# Patient Record
Sex: FEMALE
[De-identification: names, ages, dates, MRNs, and addresses within clinical notes are randomized per-mention and may not be internally consistent; named-entity substitution may affect disease eponyms.]

---

## 2020-06-12 ENCOUNTER — DASHBOARD ENCOUNTERS (OUTPATIENT)
Age: 25
End: 2020-06-12

## 2020-06-12 ENCOUNTER — OFFICE VISIT (OUTPATIENT)
Dept: URBAN - NONMETROPOLITAN AREA CLINIC 2 | Facility: CLINIC | Age: 25
End: 2020-06-12
Payer: MEDICAID

## 2020-06-12 DIAGNOSIS — K21.9 CHRONIC GERD: ICD-10-CM

## 2020-06-12 DIAGNOSIS — K58.0 IRRITABLE BOWEL SYNDROME WITH DIARRHEA: ICD-10-CM

## 2020-06-12 DIAGNOSIS — R13.19 OTHER DYSPHAGIA: ICD-10-CM

## 2020-06-12 PROBLEM — 197125005: Status: ACTIVE | Noted: 2020-06-12

## 2020-06-12 PROCEDURE — G9903 PT SCRN TBCO ID AS NON USER: HCPCS | Performed by: INTERNAL MEDICINE

## 2020-06-12 PROCEDURE — 99203 OFFICE O/P NEW LOW 30 MIN: CPT | Performed by: INTERNAL MEDICINE

## 2020-06-12 PROCEDURE — 99243 OFF/OP CNSLTJ NEW/EST LOW 30: CPT | Performed by: INTERNAL MEDICINE

## 2020-06-12 PROCEDURE — 1036F TOBACCO NON-USER: CPT | Performed by: INTERNAL MEDICINE

## 2020-06-12 RX ORDER — LEVOTHYROXINE SODIUM 25 UG/1
1 TABLET IN THE MORNING ON AN EMPTY STOMACH TABLET ORAL ONCE A DAY
Status: ACTIVE | COMMUNITY

## 2020-06-12 RX ORDER — OMEPRAZOLE 40 MG/1
1 CAPSULE 30 MINUTES BEFORE MORNING MEAL CAPSULE, DELAYED RELEASE ORAL ONCE A DAY
Status: ACTIVE | COMMUNITY

## 2020-06-12 RX ORDER — PAROXETINE HCL 10 MG
1 TABLET IN THE MORNING TABLET ORAL ONCE A DAY
Status: ACTIVE | COMMUNITY

## 2020-06-12 RX ORDER — DICYCLOMINE HYDROCHLORIDE 10 MG/1
1 CAPSULE CAPSULE ORAL THREE TIMES A DAY
Qty: 90 | Refills: 3 | OUTPATIENT
Start: 2020-06-12 | End: 2020-10-10

## 2020-06-12 NOTE — HPI-TODAY'S VISIT:
Ms. Monsivais is a 24-year-old female referred to GI clinic by Dr. Cydney Manuel for dysphagia, hoarse voice, and reflux.  She was worked up for a thyroid nodule including a CT neck which was normal.  She is not currently on anything for reflux.  She was started on twice daily omeprazole upon May 12 and referred to GI.  Today she reports that on BID PPI her heartburn is a bit better but she still gets choked frequently on bread/rice/meat and has sour taste in her mouth.  She has never had EGD before. In addition, she has IBS symptoms of loose stools and cramps after large or fatty foods.  She has not been screened for celiac or taken dicyclomine before.  Her prior imaging was reviewed.

## 2020-06-15 LAB
DEAMIDATED GLIADIN ABS, IGA: 7
DEAMIDATED GLIADIN ABS, IGG: 3
ENDOMYSIAL ANTIBODY IGA: NEGATIVE
IMMUNOGLOBULIN A, QN, SERUM: 146
T-TRANSGLUTAMINASE (TTG) IGA: <2
T-TRANSGLUTAMINASE (TTG) IGG: 4

## 2020-07-01 ENCOUNTER — OFFICE VISIT (OUTPATIENT)
Dept: URBAN - METROPOLITAN AREA MEDICAL CENTER 1 | Facility: MEDICAL CENTER | Age: 25
End: 2020-07-01